# Patient Record
Sex: MALE
[De-identification: names, ages, dates, MRNs, and addresses within clinical notes are randomized per-mention and may not be internally consistent; named-entity substitution may affect disease eponyms.]

---

## 2022-01-22 ENCOUNTER — HOSPITAL ENCOUNTER (EMERGENCY)
Dept: HOSPITAL 12 - ER | Age: 37
Discharge: LEFT BEFORE DISCHARGE | End: 2022-01-22
Payer: SELF-PAY

## 2022-01-22 DIAGNOSIS — Z53.21: Primary | ICD-10-CM
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
